# Patient Record
Sex: MALE | Race: WHITE | Employment: FULL TIME | URBAN - METROPOLITAN AREA
[De-identification: names, ages, dates, MRNs, and addresses within clinical notes are randomized per-mention and may not be internally consistent; named-entity substitution may affect disease eponyms.]

---

## 2024-05-29 NOTE — PROGRESS NOTES
Subjective:   Luciano Cueto is a 44 year old male who presents for Physical (Lab orders, )   Patient is a pleasant 44-year-old male new to this office new to this provider, no documented past medical history in system. Patient comes to office today to establish care and obtain physical.  Patient states his health is ok but he has new problem of yellowing of eyes, vanita colored stools, and bright yellow urine. This started last Wednesday. He denies abd pain. No itching. No issues. Will run labs.     PMH: Elevated blood pressure  not on meds.   PSH: no  Medications: None, no supplements. Magnesium and zinc OTC but stopped last week. Only used x 1 month    Diet: Small breakfast, crustable for lunch, 3 protein bars. Does eat out 3 times per week. Educated to cut back and on Dash.   Exercise: Lifts weights M-F. Has gym at work. Does HIT. Adds cardio at real gym here and there.  Sleep: Sleep is good. Issues when he stays out late.   Stress: minimal. Works out when stressed.   Social: Single, no kids, lives in [place] with brother.  Sexually Active: Has not been recently.  Prophylaxis: Condom if he is.   Alcohol: 2-3 beers a night on weekends only.   Tobacco: No tobacco, smokes marijuana daily.   Work:  for [employer]. [employer].   Vaccinations: Tetanus in office today.   Depression Screening: PHQ9 Score: 1  Refills: N/A            History reviewed. No pertinent past medical history.   History reviewed. No pertinent surgical history.     History/Other:    Chief Complaint Reviewed and Verified  Nursing Notes Reviewed and   Verified  Tobacco Reviewed  Allergies Reviewed  Medications Reviewed    Problem List Reviewed  Medical History Reviewed  Surgical History   Reviewed  Family History Reviewed  Social History Reviewed         Tobacco:  He has never smoked tobacco.  Social History     Tobacco Use   Smoking Status Never    Passive exposure: Never   Smokeless Tobacco Never      E-Cigarettes/Vaping       Questions Responses    E-Cigarette Use Current Every Day User          E-Cigarette/Vaping Substances       Questions Responses    Nicotine Yes          E-Cigarette/Vaping Devices       Questions Responses    Disposable Yes           Tobacco cessation counseling for <3 minutes.    Current Outpatient Medications   Medication Sig Dispense Refill    clotrimazole 1 % External Cream Apply 1 Application topically 2 (two) times daily. 60 g 1         Review of Systems:  Review of Systems   Constitutional: Negative.  Negative for activity change, chills and fever.   HENT: Negative.  Negative for congestion, ear pain, postnasal drip, sinus pain, sore throat and trouble swallowing.    Respiratory: Negative.  Negative for cough, shortness of breath and wheezing.    Cardiovascular: Negative.  Negative for chest pain and leg swelling.   Gastrointestinal: Negative.  Negative for abdominal pain, blood in stool, constipation and diarrhea.        Color change   Endocrine: Negative.    Genitourinary: Negative.  Negative for difficulty urinating, dysuria and flank pain.        Bright urine   Musculoskeletal: Negative.  Negative for arthralgias, back pain and neck stiffness.   Skin:  Positive for color change. Negative for rash.   Neurological: Negative.  Negative for dizziness and headaches.   Hematological:  Negative for adenopathy.         Objective:   BP (!) 138/96 (BP Location: Right arm, Patient Position: Sitting, Cuff Size: large)   Pulse 114   Temp 97.3 °F (36.3 °C)   Ht 5' 11\" (1.803 m)   Wt 244 lb (110.7 kg)   SpO2 98%   BMI 34.03 kg/m²  Estimated body mass index is 34.03 kg/m² as calculated from the following:    Height as of this encounter: 5' 11\" (1.803 m).    Weight as of this encounter: 244 lb (110.7 kg).  Physical Exam  Vitals and nursing note reviewed.   Constitutional:       Appearance: Normal appearance. He is obese.   HENT:      Head: Normocephalic and atraumatic.      Right Ear:  Tympanic membrane, ear canal and external ear normal. There is no impacted cerumen.      Left Ear: Tympanic membrane, ear canal and external ear normal. There is no impacted cerumen.      Nose: Nose normal. No congestion or rhinorrhea.      Mouth/Throat:      Mouth: Mucous membranes are moist.      Pharynx: No oropharyngeal exudate or posterior oropharyngeal erythema.   Eyes:      General: Scleral icterus present.      Extraocular Movements: Extraocular movements intact.      Pupils: Pupils are equal, round, and reactive to light.   Neck:      Vascular: No carotid bruit.   Cardiovascular:      Rate and Rhythm: Normal rate and regular rhythm.      Pulses: Normal pulses.      Heart sounds: Normal heart sounds. No murmur heard.  Pulmonary:      Effort: Pulmonary effort is normal. No respiratory distress.      Breath sounds: Normal breath sounds. No wheezing.   Abdominal:      General: There is no distension.      Palpations: Abdomen is soft. There is no mass.      Tenderness: There is no abdominal tenderness. There is no right CVA tenderness, left CVA tenderness, guarding or rebound.      Hernia: No hernia is present.   Musculoskeletal:         General: No swelling or tenderness. Normal range of motion.      Cervical back: Normal range of motion and neck supple. No rigidity or tenderness.      Right lower leg: No edema.      Left lower leg: No edema.   Lymphadenopathy:      Cervical: No cervical adenopathy.   Skin:     General: Skin is warm and dry.      Capillary Refill: Capillary refill takes less than 2 seconds.      Coloration: Skin is not jaundiced or pale.      Findings: No bruising, erythema, lesion or rash.   Neurological:      General: No focal deficit present.      Mental Status: He is alert and oriented to person, place, and time.   Psychiatric:         Mood and Affect: Mood normal.         Behavior: Behavior normal.           Assessment & Plan:   1. Encounter for wellness examination in adult  (Primary)  Assessment & Plan:  Wellness labs ordered.  Encouraged increasing physical activity which includes raising heart rate 3 to 5 days/week for at least 30 minutes at a time, while also performing mild strength exercises.  Patient counseled on importance of dietary modifications, which may include limiting fats, red meat, and carbohydrates, while increasing fruit and vegetable intake, and trying to adhere to a low sodium/Mediterranean diet.  Encouraged to manage stress.  Encouraged good sleep habits.  Encouraged good sexual health.    Patient aware of plan of care. All questions answered to satisfaction of the patient. Patient instructed to call office or reach out via [employer] if any issues arise. For urgent issues and/or reviewed red flags please proceed to the urgent care or ER.  Also, inform the nurse practitioner with any new symptoms or medication side effects.      Orders:  -     Hemoglobin A1C; Future; Expected date: 05/30/2024  -     CBC With Differential With Platelet; Future; Expected date: 05/30/2024  -     Comp Metabolic Panel (14); Future; Expected date: 05/30/2024  -     Lipid Panel; Future; Expected date: 05/30/2024  -     TSH W Reflex To Free T4; Future; Expected date: 05/30/2024  2. Colt-colored stools  Assessment & Plan:  Reports colt colored stools that have started in the last week with bright urine and yellowing of eyes.  Check CMP    Orders:  -     Comp Metabolic Panel (14); Future; Expected date: 05/30/2024  3. Urine color appears bright  Assessment & Plan:  Reports colt colored stools that have started in the last week with bright urine and yellowing of eyes.  Check CMP  Screen in office with large amounts of bilirubin  Orders:  -     Comp Metabolic Panel (14); Future; Expected date: 05/30/2024  -     URINALYSIS, AUTO, W/O SCOPE  4. Yellow eyes  Assessment & Plan:  Reports colt colored stools that have started in the last week with bright urine and yellowing of eyes.  Check CMP  Drinks  2-3 drinks daily on weekends.  No supplements  Excessive protein when working out.  Orders:  -     Comp Metabolic Panel (14); Future; Expected date: 05/30/2024  5. Need for tetanus booster  Assessment & Plan:  Tetanus in office today.  Orders:  -     TETANUS, DIPHTHERIA TOXOIDS AND ACELLULAR PERTUSIS VACCINE (TDAP), >7 YEARS, IM USE  6. Tinea pedis of both feet  Assessment & Plan:  Bilateral feet interdigits mild  Start clotrimazole twice daily x 14 days  Follow-up as needed  Orders:  -     Clotrimazole; Apply 1 Application topically 2 (two) times daily.  Dispense: 60 g; Refill: 1  7. Onychomycosis  Assessment & Plan:  Right foot with onychomycosis to multiple nails  Not a candidate for terbinafine with likely liver issue secondary to jaundice.    Referral to podiatry for evaluation for removal    Orders:  -     Podiatry Referral        Return if symptoms worsen or fail to improve.    MONA Love, 5/29/2024, 1:31 PM

## 2024-05-30 ENCOUNTER — APPOINTMENT (OUTPATIENT)
Dept: LAB | Age: 44
End: 2024-05-30
Payer: COMMERCIAL

## 2024-05-30 ENCOUNTER — OFFICE VISIT (OUTPATIENT)
Dept: FAMILY MEDICINE CLINIC | Facility: CLINIC | Age: 44
End: 2024-05-30

## 2024-05-30 ENCOUNTER — LAB ENCOUNTER (OUTPATIENT)
Dept: LAB | Age: 44
End: 2024-05-30
Payer: COMMERCIAL

## 2024-05-30 VITALS
OXYGEN SATURATION: 98 % | DIASTOLIC BLOOD PRESSURE: 88 MMHG | TEMPERATURE: 97 F | HEART RATE: 114 BPM | WEIGHT: 244 LBS | HEIGHT: 71 IN | BODY MASS INDEX: 34.16 KG/M2 | SYSTOLIC BLOOD PRESSURE: 136 MMHG

## 2024-05-30 DIAGNOSIS — R17 YELLOW EYES: ICD-10-CM

## 2024-05-30 DIAGNOSIS — B35.3 TINEA PEDIS OF BOTH FEET: ICD-10-CM

## 2024-05-30 DIAGNOSIS — R82.998 URINE COLOR APPEARS BRIGHT: ICD-10-CM

## 2024-05-30 DIAGNOSIS — R19.5 CLAY-COLORED STOOLS: ICD-10-CM

## 2024-05-30 DIAGNOSIS — B35.1 ONYCHOMYCOSIS: ICD-10-CM

## 2024-05-30 DIAGNOSIS — Z00.00 ENCOUNTER FOR WELLNESS EXAMINATION IN ADULT: ICD-10-CM

## 2024-05-30 DIAGNOSIS — Z00.00 ENCOUNTER FOR WELLNESS EXAMINATION IN ADULT: Primary | ICD-10-CM

## 2024-05-30 DIAGNOSIS — Z23 NEED FOR TETANUS BOOSTER: ICD-10-CM

## 2024-05-30 LAB
ALBUMIN SERPL-MCNC: 3.7 G/DL (ref 3.2–4.8)
ALBUMIN/GLOB SERPL: 0.9 {RATIO} (ref 1–2)
ALP LIVER SERPL-CCNC: 131 U/L
ALT SERPL-CCNC: >3300 U/L
ANION GAP SERPL CALC-SCNC: 7 MMOL/L (ref 0–18)
APPEARANCE: CLEAR
AST SERPL-CCNC: 1871 U/L (ref ?–34)
BASOPHILS # BLD AUTO: 0.12 X10(3) UL (ref 0–0.2)
BASOPHILS NFR BLD AUTO: 1.6 %
BILIRUB SERPL-MCNC: 14.6 MG/DL (ref 0.3–1.2)
BUN BLD-MCNC: 10 MG/DL (ref 9–23)
BUN/CREAT SERPL: 10.9 (ref 10–20)
CALCIUM BLD-MCNC: 9 MG/DL (ref 8.7–10.4)
CHLORIDE SERPL-SCNC: 102 MMOL/L (ref 98–112)
CHOLEST SERPL-MCNC: 130 MG/DL (ref ?–200)
CO2 SERPL-SCNC: 28 MMOL/L (ref 21–32)
CREAT BLD-MCNC: 0.92 MG/DL
DEPRECATED RDW RBC AUTO: 44.8 FL (ref 35.1–46.3)
EGFRCR SERPLBLD CKD-EPI 2021: 106 ML/MIN/1.73M2 (ref 60–?)
EOSINOPHIL # BLD AUTO: 0.03 X10(3) UL (ref 0–0.7)
EOSINOPHIL NFR BLD AUTO: 0.4 %
ERYTHROCYTE [DISTWIDTH] IN BLOOD BY AUTOMATED COUNT: 13.9 % (ref 11–15)
EST. AVERAGE GLUCOSE BLD GHB EST-MCNC: 117 MG/DL (ref 68–126)
FASTING PATIENT LIPID ANSWER: YES
FASTING STATUS PATIENT QL REPORTED: YES
GLOBULIN PLAS-MCNC: 4 G/DL (ref 2–3.5)
GLUCOSE (URINE DIPSTICK): NEGATIVE MG/DL
GLUCOSE BLD-MCNC: 92 MG/DL (ref 70–99)
HBA1C MFR BLD: 5.7 % (ref ?–5.7)
HCT VFR BLD AUTO: 46.1 %
HDLC SERPL-MCNC: <5 MG/DL (ref 40–59)
HGB BLD-MCNC: 15.7 G/DL
IMM GRANULOCYTES # BLD AUTO: 0.03 X10(3) UL (ref 0–1)
IMM GRANULOCYTES NFR BLD: 0.4 %
KETONES (URINE DIPSTICK): NEGATIVE MG/DL
LEUKOCYTES: NEGATIVE
LYMPHOCYTES # BLD AUTO: 1.54 X10(3) UL (ref 1–4)
LYMPHOCYTES NFR BLD AUTO: 21.1 %
MCH RBC QN AUTO: 30 PG (ref 26–34)
MCHC RBC AUTO-ENTMCNC: 34.1 G/DL (ref 31–37)
MCV RBC AUTO: 88.1 FL
MONOCYTES # BLD AUTO: 0.97 X10(3) UL (ref 0.1–1)
MONOCYTES NFR BLD AUTO: 13.3 %
MULTISTIX LOT#: ABNORMAL NUMERIC
NEUTROPHILS # BLD AUTO: 4.61 X10 (3) UL (ref 1.5–7.7)
NEUTROPHILS # BLD AUTO: 4.61 X10(3) UL (ref 1.5–7.7)
NEUTROPHILS NFR BLD AUTO: 63.2 %
NITRITE, URINE: NEGATIVE
OSMOLALITY SERPL CALC.SUM OF ELEC: 283 MOSM/KG (ref 275–295)
PH, URINE: 6 (ref 4.5–8)
PLATELET # BLD AUTO: 262 10(3)UL (ref 150–450)
POTASSIUM SERPL-SCNC: 4.3 MMOL/L (ref 3.5–5.1)
PROT SERPL-MCNC: 7.7 G/DL (ref 5.7–8.2)
PROTEIN (URINE DIPSTICK): NEGATIVE MG/DL
RBC # BLD AUTO: 5.23 X10(6)UL
SODIUM SERPL-SCNC: 137 MMOL/L (ref 136–145)
SPECIFIC GRAVITY: 1.01 (ref 1–1.03)
TRIGL SERPL-MCNC: 134 MG/DL (ref 30–149)
TSI SER-ACNC: 0.29 MIU/ML (ref 0.55–4.78)
URINE-COLOR: YELLOW
UROBILINOGEN,SEMI-QN: 0.2 MG/DL (ref 0–1.9)
WBC # BLD AUTO: 7.3 X10(3) UL (ref 4–11)

## 2024-05-30 PROCEDURE — 81003 URINALYSIS AUTO W/O SCOPE: CPT

## 2024-05-30 PROCEDURE — 90471 IMMUNIZATION ADMIN: CPT

## 2024-05-30 PROCEDURE — 84443 ASSAY THYROID STIM HORMONE: CPT

## 2024-05-30 PROCEDURE — 99214 OFFICE O/P EST MOD 30 MIN: CPT

## 2024-05-30 PROCEDURE — 90715 TDAP VACCINE 7 YRS/> IM: CPT

## 2024-05-30 PROCEDURE — 84439 ASSAY OF FREE THYROXINE: CPT

## 2024-05-30 PROCEDURE — 99386 PREV VISIT NEW AGE 40-64: CPT

## 2024-05-30 PROCEDURE — 83036 HEMOGLOBIN GLYCOSYLATED A1C: CPT

## 2024-05-30 PROCEDURE — 85025 COMPLETE CBC W/AUTO DIFF WBC: CPT

## 2024-05-30 PROCEDURE — 80053 COMPREHEN METABOLIC PANEL: CPT

## 2024-05-30 PROCEDURE — 84481 FREE ASSAY (FT-3): CPT

## 2024-05-30 PROCEDURE — 36415 COLL VENOUS BLD VENIPUNCTURE: CPT

## 2024-05-30 PROCEDURE — 80061 LIPID PANEL: CPT

## 2024-05-30 RX ORDER — CLOTRIMAZOLE 1 %
1 CREAM (GRAM) TOPICAL 2 TIMES DAILY
Qty: 60 G | Refills: 1 | Status: SHIPPED | OUTPATIENT
Start: 2024-05-30

## 2024-05-30 NOTE — ASSESSMENT & PLAN NOTE
Reports vanita colored stools that have started in the last week with bright urine and yellowing of eyes.  Check CMP  Screen in office with large amounts of bilirubin

## 2024-05-30 NOTE — ASSESSMENT & PLAN NOTE
Reports vanita colored stools that have started in the last week with bright urine and yellowing of eyes.  Check CMP  Drinks 2-3 drinks daily on weekends.  No supplements  Excessive protein when working out.

## 2024-05-30 NOTE — ASSESSMENT & PLAN NOTE
Wellness labs ordered.  Encouraged increasing physical activity which includes raising heart rate 3 to 5 days/week for at least 30 minutes at a time, while also performing mild strength exercises.  Patient counseled on importance of dietary modifications, which may include limiting fats, red meat, and carbohydrates, while increasing fruit and vegetable intake, and trying to adhere to a low sodium/Mediterranean diet.  Encouraged to manage stress.  Encouraged good sleep habits.  Encouraged good sexual health.    Patient aware of plan of care. All questions answered to satisfaction of the patient. Patient instructed to call office or reach out via [email or web address] if any issues arise. For urgent issues and/or reviewed red flags please proceed to the urgent care or ER.  Also, inform the nurse practitioner with any new symptoms or medication side effects.

## 2024-05-30 NOTE — ASSESSMENT & PLAN NOTE
Reports vanita colored stools that have started in the last week with bright urine and yellowing of eyes.  Check CMP

## 2024-05-30 NOTE — ASSESSMENT & PLAN NOTE
Right foot with onychomycosis to multiple nails  Not a candidate for terbinafine with likely liver issue secondary to jaundice.    Referral to podiatry for evaluation for removal

## 2024-05-31 ENCOUNTER — APPOINTMENT (OUTPATIENT)
Dept: CT IMAGING | Facility: HOSPITAL | Age: 44
End: 2024-05-31
Attending: EMERGENCY MEDICINE
Payer: COMMERCIAL

## 2024-05-31 ENCOUNTER — HOSPITAL ENCOUNTER (EMERGENCY)
Facility: HOSPITAL | Age: 44
Discharge: STILL ADMITTED OR ADMINISTRATIVE | End: 2024-05-31
Payer: COMMERCIAL

## 2024-05-31 ENCOUNTER — TELEPHONE (OUTPATIENT)
Dept: FAMILY MEDICINE CLINIC | Facility: CLINIC | Age: 44
End: 2024-05-31

## 2024-05-31 ENCOUNTER — HOSPITAL ENCOUNTER (EMERGENCY)
Facility: HOSPITAL | Age: 44
Discharge: HOME | End: 2024-05-31
Attending: EMERGENCY MEDICINE
Payer: COMMERCIAL

## 2024-05-31 VITALS
DIASTOLIC BLOOD PRESSURE: 77 MMHG | RESPIRATION RATE: 15 BRPM | OXYGEN SATURATION: 97 % | HEIGHT: 71 IN | HEART RATE: 86 BPM | BODY MASS INDEX: 34.16 KG/M2 | WEIGHT: 244 LBS | SYSTOLIC BLOOD PRESSURE: 121 MMHG | TEMPERATURE: 98 F

## 2024-05-31 DIAGNOSIS — K74.60 CIRRHOSIS OF LIVER WITHOUT ASCITES, UNSPECIFIED HEPATIC CIRRHOSIS TYPE (HCC): ICD-10-CM

## 2024-05-31 DIAGNOSIS — E05.90 SUBCLINICAL HYPERTHYROIDISM: Primary | ICD-10-CM

## 2024-05-31 DIAGNOSIS — R17 ELEVATED BILIRUBIN: Primary | ICD-10-CM

## 2024-05-31 DIAGNOSIS — R17 JAUNDICE: Primary | ICD-10-CM

## 2024-05-31 DIAGNOSIS — R17 ELEVATED BILIRUBIN: ICD-10-CM

## 2024-05-31 DIAGNOSIS — E78.6 LOW HDL (UNDER 40): ICD-10-CM

## 2024-05-31 DIAGNOSIS — R74.8 ELEVATED LIVER ENZYMES: ICD-10-CM

## 2024-05-31 LAB
ALBUMIN SERPL-MCNC: 3.6 G/DL (ref 3.2–4.8)
ALP LIVER SERPL-CCNC: 132 U/L
ALT SERPL-CCNC: 3135 U/L
ANION GAP SERPL CALC-SCNC: 3 MMOL/L (ref 0–18)
AST SERPL-CCNC: 1064 U/L (ref ?–34)
BASOPHILS # BLD AUTO: 0.15 X10(3) UL (ref 0–0.2)
BASOPHILS NFR BLD AUTO: 1.5 %
BILIRUB DIRECT SERPL-MCNC: 10.6 MG/DL (ref ?–0.3)
BILIRUB SERPL-MCNC: 14.6 MG/DL (ref 0.3–1.2)
BUN BLD-MCNC: 11 MG/DL (ref 9–23)
BUN/CREAT SERPL: 11.2 (ref 10–20)
CALCIUM BLD-MCNC: 8.4 MG/DL (ref 8.7–10.4)
CHLORIDE SERPL-SCNC: 103 MMOL/L (ref 98–112)
CO2 SERPL-SCNC: 28 MMOL/L (ref 21–32)
CREAT BLD-MCNC: 0.98 MG/DL
DEPRECATED RDW RBC AUTO: 46.1 FL (ref 35.1–46.3)
EGFRCR SERPLBLD CKD-EPI 2021: 98 ML/MIN/1.73M2 (ref 60–?)
EOSINOPHIL # BLD AUTO: 0.04 X10(3) UL (ref 0–0.7)
EOSINOPHIL NFR BLD AUTO: 0.4 %
ERYTHROCYTE [DISTWIDTH] IN BLOOD BY AUTOMATED COUNT: 14.3 % (ref 11–15)
ETHANOL SERPL-MCNC: <3 MG/DL (ref ?–3)
GLUCOSE BLD-MCNC: 105 MG/DL (ref 70–99)
HCT VFR BLD AUTO: 44.6 %
HGB BLD-MCNC: 15 G/DL
IGA SERPL-MCNC: 525.4 MG/DL (ref 40–350)
IGM SERPL-MCNC: 360.6 MG/DL (ref 50–300)
IMM GRANULOCYTES # BLD AUTO: 0.02 X10(3) UL (ref 0–1)
IMM GRANULOCYTES NFR BLD: 0.2 %
IMMUNOGLOBULIN PNL SER-MCNC: 1752 MG/DL (ref 650–1600)
INR BLD: 1.35 (ref 0.8–1.2)
LIPASE SERPL-CCNC: 46 U/L (ref 13–75)
LYMPHOCYTES # BLD AUTO: 3.83 X10(3) UL (ref 1–4)
LYMPHOCYTES NFR BLD AUTO: 39.5 %
MCH RBC QN AUTO: 30.1 PG (ref 26–34)
MCHC RBC AUTO-ENTMCNC: 33.6 G/DL (ref 31–37)
MCV RBC AUTO: 89.4 FL
MONOCYTES # BLD AUTO: 1.19 X10(3) UL (ref 0.1–1)
MONOCYTES NFR BLD AUTO: 12.3 %
NEUTROPHILS # BLD AUTO: 4.47 X10 (3) UL (ref 1.5–7.7)
NEUTROPHILS # BLD AUTO: 4.47 X10(3) UL (ref 1.5–7.7)
NEUTROPHILS NFR BLD AUTO: 46.1 %
OSMOLALITY SERPL CALC.SUM OF ELEC: 278 MOSM/KG (ref 275–295)
PLATELET # BLD AUTO: 399 10(3)UL (ref 150–450)
POTASSIUM SERPL-SCNC: 3.8 MMOL/L (ref 3.5–5.1)
PROT SERPL-MCNC: 7.7 G/DL (ref 5.7–8.2)
PROTHROMBIN TIME: 17.5 SECONDS (ref 11.6–14.8)
RBC # BLD AUTO: 4.99 X10(6)UL
SODIUM SERPL-SCNC: 134 MMOL/L (ref 136–145)
T3FREE SERPL-MCNC: 2.9 PG/ML (ref 2.4–4.2)
T4 FREE SERPL-MCNC: 1.3 NG/DL (ref 0.8–1.7)
WBC # BLD AUTO: 9.7 X10(3) UL (ref 4–11)

## 2024-05-31 PROCEDURE — 82784 ASSAY IGA/IGD/IGG/IGM EACH: CPT | Performed by: EMERGENCY MEDICINE

## 2024-05-31 PROCEDURE — 74177 CT ABD & PELVIS W/CONTRAST: CPT | Performed by: EMERGENCY MEDICINE

## 2024-05-31 PROCEDURE — 85610 PROTHROMBIN TIME: CPT | Performed by: EMERGENCY MEDICINE

## 2024-05-31 PROCEDURE — 82077 ASSAY SPEC XCP UR&BREATH IA: CPT | Performed by: EMERGENCY MEDICINE

## 2024-05-31 PROCEDURE — 83516 IMMUNOASSAY NONANTIBODY: CPT | Performed by: EMERGENCY MEDICINE

## 2024-05-31 PROCEDURE — 83690 ASSAY OF LIPASE: CPT | Performed by: EMERGENCY MEDICINE

## 2024-05-31 PROCEDURE — 80048 BASIC METABOLIC PNL TOTAL CA: CPT | Performed by: EMERGENCY MEDICINE

## 2024-05-31 PROCEDURE — 99285 EMERGENCY DEPT VISIT HI MDM: CPT

## 2024-05-31 PROCEDURE — 36415 COLL VENOUS BLD VENIPUNCTURE: CPT

## 2024-05-31 PROCEDURE — 99284 EMERGENCY DEPT VISIT MOD MDM: CPT

## 2024-05-31 PROCEDURE — 85025 COMPLETE CBC W/AUTO DIFF WBC: CPT | Performed by: EMERGENCY MEDICINE

## 2024-05-31 PROCEDURE — 80074 ACUTE HEPATITIS PANEL: CPT | Performed by: EMERGENCY MEDICINE

## 2024-05-31 PROCEDURE — 80076 HEPATIC FUNCTION PANEL: CPT | Performed by: EMERGENCY MEDICINE

## 2024-05-31 NOTE — PROGRESS NOTES
1. Subclinical hyperthyroidism  TSH 0.288 and t3 and t4 wnl  Thyroid US ordered.  Referral to endocrine.  - US THYROID (CPT=76536); Future  - Endocrine Referral - In Network    2. Elevated liver enzymes  ALT greater than 3300  AST 1871  Alk phos 131  Bili 14.6  DDX include fulminant hepatitis, obstructing stone  Needs Liver US and repeat labs to assess for progression and hepatitis.  Called and spoke with patient, will proceed to ED.   - US LIVER (CPT=76705); Future  - GGT (Gamma Glutamyl Transpeptidase) [E]; Future  - Hepatitis A B + C profile [E]; Future  - Gastro Referral - In Network    3. Elevated bilirubin  Bili 14.6, additional labs ordered.  Eval with US  Sent to ED  - US LIVER (CPT=76705); Future  - GGT (Gamma Glutamyl Transpeptidase) [E]; Future  - Hepatitis A B + C profile [E]; Future  - Gastro Referral - In Network    4. Low HDL (under 40)  Assess liver with US, recheck 6 week following work up.   - US LIVER (CPT=76705); Future  - GGT (Gamma Glutamyl Transpeptidase) [E]; Future  - Hepatitis A B + C profile [E]; Future    Anthony Franklin, APRN

## 2024-05-31 NOTE — TELEPHONE ENCOUNTER
Called and spoke with patient. Recommend repeat labs and liver US. Recommend with Bilirubin being 14.6 eval in Ed to expedite process and rule out obstruction or progressing fulminant hepatitis. Patient states understanding.  Called [place] ED spoke with charge, made aware.

## 2024-06-01 ENCOUNTER — TELEPHONE (OUTPATIENT)
Facility: CLINIC | Age: 44
End: 2024-06-01

## 2024-06-01 DIAGNOSIS — B15.9 VIRAL HEPATITIS A WITHOUT HEPATIC COMA: Primary | ICD-10-CM

## 2024-06-01 LAB
HAV IGM SER QL: REACTIVE
HBV CORE IGM SER QL: NONREACTIVE
HBV SURFACE AG SERPL QL IA: NONREACTIVE
HCV AB SERPL QL IA: NONREACTIVE

## 2024-06-01 NOTE — ED QUICK NOTES
Call made to patient's listed phone number ([phone]). No answer, and no voicemail service provided.    This RN attempting to call patient to notify him of test results from ED visit (hepatitis A IgM). Per gastroenterology, patient may still be able to follow-up as outpatient. Patient should return to ED for signs of encephalopathy or for vomiting.    Will endorse to next charge RN to attempt calling closer to 7am.

## 2024-06-01 NOTE — ED PROVIDER NOTES
Patient Seen in: [facility]    History   No chief complaint on file.      HPI    The patient presents to the ED complaining of his body turning yellow several days ago.  He states that his eyes are now yellow as well as the skin.  Went to immediate care yesterday which showed abnormal labs.  He was referred to the ER for further workup.  He denies any abdominal pain or other complaints.    History reviewed. History reviewed. No pertinent past medical history.    History reviewed. History reviewed. No pertinent surgical history.      Medications :  (Not in a hospital admission)       Family History   Problem Relation Age of Onset    Hypertension Father     Diabetes Father     Hypertension Mother     Diabetes Mother        Smoking Status:   Social History     Socioeconomic History    Marital status: Single   Tobacco Use    Smoking status: Never     Passive exposure: Never    Smokeless tobacco: Never   Vaping Use    Vaping status: Every Day    Substances: Nicotine    Devices: Disposable   Substance and Sexual Activity    Alcohol use: Yes    Drug use: Yes     Types: Cocaine, Cannabis       Constitutional and vital signs reviewed.      Social History and Family History elements reviewed from today, pertinent positives to the presenting problem noted.    Physical Exam     ED Triage Vitals [05/31/24 1952]   BP (!) 154/104   Pulse 101   Resp 20   Temp 98.4 °F (36.9 °C)   Temp src    SpO2 99 %   O2 Device None (Room air)       All measures to prevent infection transmission during my interaction with the patient were taken. Handwashing was performed prior to and after the exam.  Stethoscope and any equipment used during my examination was cleaned with super sani-cloth germicidal wipes following the exam.     Physical Exam  Vitals and nursing note reviewed.   Constitutional:       General: He is not in acute distress.     Appearance: He is well-developed. He is obese.   HENT:      Head:  Normocephalic and atraumatic.   Eyes:      General: Scleral icterus present.         Right eye: No discharge.         Left eye: No discharge.      Conjunctiva/sclera: Conjunctivae normal.   Neck:      Trachea: No tracheal deviation.   Cardiovascular:      Rate and Rhythm: Normal rate and regular rhythm.      Heart sounds: Normal heart sounds.   Pulmonary:      Effort: Pulmonary effort is normal. No respiratory distress.      Breath sounds: Normal breath sounds.   Abdominal:      General: There is no distension.      Palpations: Abdomen is soft.      Tenderness: There is no abdominal tenderness. There is no guarding or rebound.   Musculoskeletal:         General: No deformity.   Skin:     General: Skin is warm and dry.      Coloration: Skin is jaundiced.   Neurological:      Mental Status: He is alert and oriented to person, place, and time.   Psychiatric:         Mood and Affect: Mood normal.         Behavior: Behavior normal.         ED Course        Labs Reviewed   BASIC METABOLIC PANEL (8) - Abnormal; Notable for the following components:       Result Value    Glucose 105 (*)     Sodium 134 (*)     Calcium, Total 8.4 (*)     All other components within normal limits   HEPATIC FUNCTION PANEL (7) - Abnormal; Notable for the following components:    AST 1,064 (*)     ALT 3,135 (*)     Alkaline Phosphatase 132 (*)     Bilirubin, Total 14.6 (*)     Bilirubin, Direct 10.6 (*)     All other components within normal limits   PROTHROMBIN TIME (PT) - Abnormal; Notable for the following components:    PT 17.5 (*)     INR 1.35 (*)     All other components within normal limits   HEPATITIS PANEL, ACUTE (4) - Abnormal; Notable for the following components:    Hepatitis A Ab, IgM Reactive (*)     All other components within normal limits   IMMUNOGLOBULIN M, QUANT - Abnormal; Notable for the following components:    Immunoglobulin M 360.6 (*)     All other components within normal limits   IMMUNOGLOBULIN G, QUANT - Abnormal;  Notable for the following components:    Immunoglobulin G 1,752 (*)     All other components within normal limits   IMMUNOGLOBULIN A, QUANT - Abnormal; Notable for the following components:    Immunoglobulin A 525.40 (*)     All other components within normal limits   CBC W/ DIFFERENTIAL - Abnormal; Notable for the following components:    Monocyte Absolute 1.19 (*)     All other components within normal limits   LIPASE - Normal   ETHYL ALCOHOL - Normal   CBC WITH DIFFERENTIAL WITH PLATELET    Narrative:     The following orders were created for panel order CBC With Differential With Platelet.                  Procedure                               Abnormality         Status                                     ---------                               -----------         ------                                     CBC W/ DIFFERENTIAL[[id number]]          Abnormal            Final result                                                 Please view results for these tests on the individual orders.   SCAN SLIDE   ACTIN (SMOOTH MUSCLE) ANTIBODY   HOLD REACTIVE HEPATITIS A IGM       As Interpreted by me    Imaging Results Available and Reviewed while in ED: CT abdomen pelvis with IV contrast  ED Medications Administered:   Medications   iopamidol 76% (ISOVUE-370) injection for power injector (80 mL Intravenous Given 5/31/24 2159)         MDM     Vitals:    05/31/24 1952 05/31/24 2030 05/31/24 2200 05/31/24 2245   BP: (!) 154/104 127/79 139/82 121/77   Pulse: 101 89 93 86   Resp: 20 22 20 15   Temp: 98.4 °F (36.9 °C)      SpO2: 99% 97% 98% 97%   Weight: 110.7 kg      Height: 180.3 cm (5' 11\")        *I personally reviewed and interpreted all ED vitals.    Pulse Ox: 97%, Room air, Normal     Monitor Interpretation:   normal sinus rhythm as interpreted by me.  The cardiac monitor was ordered to monitor heart rate.    Differential Diagnosis/ Diagnostic Considerations: Alcoholic hepatitis, pancreatic mass, obstructive jaundice,  hepatitis, autoimmune, other    Complicating Factors: The patient already has does not have any pertinent problems on file. to contribute to the complexity of this ED evaluation.    Medical Decision Making  The patient presents to the ED with painless jaundice.  Nondistressed on examination.  No abdominal tenderness, no other findings on exam.  Laboratory testing sent and shows significant LFT elevation.  CT obtained which shows no intra-abdominal pathology.  I discussed with Dr. Bullock who recommends additional laboratory testing including hepatitis workup and autoimmune testing and outpatient follow-up.  Additional labs ordered and patient stable for discharge.  He agrees to follow-up with GI ASAP for further evaluation.    Problems Addressed:  Cirrhosis of liver without ascites, unspecified hepatic cirrhosis type (HCC): acute illness or injury that poses a threat to life or bodily functions  Jaundice: acute illness or injury that poses a threat to life or bodily functions    Amount and/or Complexity of Data Reviewed  Labs: ordered. Decision-making details documented in ED Course.  Radiology: ordered and independent interpretation performed. Decision-making details documented in ED Course.     Details: I personally reviewed the patient's CT abdomen pelvis and noted no free fluid or SBO  Discussion of management or test interpretation with external provider(s):  I discussed with Dr. Bullock for GI consultation        Condition upon leaving the department: Stable    Disposition and Plan     Clinical Impression:  1. Jaundice    2. Cirrhosis of liver without ascites, unspecified hepatic cirrhosis type (HCC)        Disposition:  Discharge    Follow-up:  Kenneth Bullock MD  [address] [place] [address]  [facility] [address]  [phone]    Schedule an appointment as soon as possible for a visit in 5 day(s)        Medications Prescribed:  Discharge Medication List as of 5/31/2024 10:51 PM

## 2024-06-01 NOTE — TELEPHONE ENCOUNTER
GI RNs:  This patient was seen in the ER for jaundice and found to have acute hepatitis A.  He needs to have labs drawn on Monday and see a provider in the next few days.

## 2024-06-01 NOTE — ED INITIAL ASSESSMENT (HPI)
S: Pt went to see pcp due to bright yellow urine and change in stool, then pt developed scleral jaundice. Labs show significant transaminitis and elevated bilirubin.

## 2024-06-01 NOTE — ED QUICK NOTES
Pt cleared by MD for discharge. Belongings with patient. Patient instructions reviewed including when and how to follow up with healthcare and when to seek for medical treatment. Peripheral IV memoved. Pt denies abdominal pain. Denies n/v. Pt states he has [facility] my chart and he will follow up online for results. Pt aware that he needs to follow up with GI

## 2024-06-03 ENCOUNTER — LAB ENCOUNTER (OUTPATIENT)
Dept: LAB | Age: 44
End: 2024-06-03
Attending: INTERNAL MEDICINE
Payer: COMMERCIAL

## 2024-06-03 DIAGNOSIS — B15.9 VIRAL HEPATITIS A WITHOUT HEPATIC COMA: ICD-10-CM

## 2024-06-03 LAB
ACTIN SMOOTH MUSCLE AB: 25 UNITS
ALBUMIN SERPL-MCNC: 3.5 G/DL (ref 3.2–4.8)
ALP LIVER SERPL-CCNC: 135 U/L
ALT SERPL-CCNC: 1369 U/L
AST SERPL-CCNC: 246 U/L (ref ?–34)
BILIRUB DIRECT SERPL-MCNC: 8.1 MG/DL (ref ?–0.3)
BILIRUB SERPL-MCNC: 10.2 MG/DL (ref 0.3–1.2)
INR BLD: 1.25 (ref 0.8–1.2)
PROT SERPL-MCNC: 7.4 G/DL (ref 5.7–8.2)
PROTHROMBIN TIME: 16.4 SECONDS (ref 11.6–14.8)

## 2024-06-03 PROCEDURE — 36415 COLL VENOUS BLD VENIPUNCTURE: CPT

## 2024-06-03 PROCEDURE — 80076 HEPATIC FUNCTION PANEL: CPT

## 2024-06-03 PROCEDURE — 85610 PROTHROMBIN TIME: CPT

## 2024-06-03 NOTE — TELEPHONE ENCOUNTER
Called and spoke to the patient, date of birth and name verified.    He was informed to complete additional blood work.    He agreed to do this after work today, he understood it needs to be done before his appointment tomorrow with Isha CALLEJAS.

## 2024-06-04 ENCOUNTER — OFFICE VISIT (OUTPATIENT)
Facility: CLINIC | Age: 44
End: 2024-06-04

## 2024-06-04 VITALS
SYSTOLIC BLOOD PRESSURE: 134 MMHG | WEIGHT: 247 LBS | BODY MASS INDEX: 34.58 KG/M2 | DIASTOLIC BLOOD PRESSURE: 81 MMHG | HEIGHT: 71 IN | HEART RATE: 80 BPM

## 2024-06-04 DIAGNOSIS — R17 ELEVATED BILIRUBIN: ICD-10-CM

## 2024-06-04 DIAGNOSIS — R17 JAUNDICE: ICD-10-CM

## 2024-06-04 DIAGNOSIS — R16.0 HEPATOMEGALY: ICD-10-CM

## 2024-06-04 DIAGNOSIS — B15.9 ACUTE HEPATITIS A: Primary | ICD-10-CM

## 2024-06-04 PROCEDURE — 99214 OFFICE O/P EST MOD 30 MIN: CPT

## 2024-06-04 NOTE — PATIENT INSTRUCTIONS
- go to lab in 1 week for repeat testing  (June 11th)    -avoid all alcohol / tylenol     - Household contacts should see primary doctor for vaccine and possibly immunoglobulin    -follow up with me in 6 months

## 2024-06-04 NOTE — H&P
[facility] - Gastroenterology                                                                                                               Reason for consult: hepatitis A    Requesting physician or provider: No primary care provider on file.    Chief Complaint   Patient presents with    Consult     For elevated bilirubin       HPI:   Luciano Cueto is a 44 year old year-old male with active diagnoses including acute viral hepatitis A. Prior medical/surgical history in note table.    he is here today for ER follow up  #acute hepatitis A  -evaluated in ED on 5/31/24 for skin & eye jaundice, fatigue. Severe LFT elevation, +hepatitis A Ab IgM on labwork. CT abdomen +hepatomegaly w/ mild hepatic steatosis. Incidental cholelithiasis. No CBD dilatation.   -LFTs trending down, last checked 6/3. Direct bilirubin improved from 10.6 to 8.1  -pt denies recent travel. Symptoms prior to ED were 3 days fatigue, nausea, 2 episodes emesis, no appetite, jaundice, chills/sweats. Light colored stool  -since ED patient feels fatigue and jaundice are improving, denies abdominal pain    Patient denies symptoms of vomiting, dyspepsia, dysphagia, odynophagia, globus sensation, heartburn, hematemesis, abdominal pain, change in bowel habits, constipation, diarrhea, hematochezia, or melena. he denies recent change in appetite, fever or unintentional weight loss.      Last colonoscopy: none   Last EGD: none     NSAIDS: none   Tobacco: none   Alcohol: none currently, prior 8 drinks on weekend  Marijuana: occasional  Illicit drugs: none     FH GI malignancy: none   FH IBD: none     No history of adverse reaction to sedation  No ELLA  No anticoagulants/antiplatelet  No pacemaker/defibrillator    Wt Readings from Last 6 Encounters:   06/04/24 247 lb (112 kg)   05/31/24 244 lb (110.7 kg)   05/30/24 244 lb (110.7 kg)        History, Medications, Allergies,  ROS:      Past Medical History:    Hepatitis A      History reviewed. No pertinent surgical history.   Family Hx:   Family History   Problem Relation Age of Onset    Hypertension Father     Diabetes Father     Hypertension Mother     Diabetes Mother       Social History:   Social History     Socioeconomic History    Marital status: Single   Tobacco Use    Smoking status: Never     Passive exposure: Never    Smokeless tobacco: Never   Vaping Use    Vaping status: Every Day    Substances: Nicotine    Devices: Disposable   Substance and Sexual Activity    Alcohol use: Not Currently    Drug use: Yes     Types: Cocaine, Cannabis        Medications (Active prior to today's visit):  Current Outpatient Medications   Medication Sig Dispense Refill    clotrimazole 1 % External Cream Apply 1 Application topically 2 (two) times daily. 60 g 1       Allergies:  No Known Allergies    ROS:   CONSTITUTIONAL: negative for fevers, chills, sweats  EYES Negative for redness, and diplopia  HEENT: Negative for hoarseness  RESPIRATORY: Negative for cough and severe shortness of breath  CARDIOVASCULAR: Negative for crushing sub-sternal chest pain  GASTROINTESTINAL: See HPI  GENITOURINARY: Negative for dysuria  MUSCULOSKELETAL: Negative for arthralgias and myalgias  SKIN: positive jaundice, Negative for rash or pruritus  NEUROLOGICAL: Negative for dizziness and headaches  BEHAVIOR/PSYCH: Negative for psychotic behavior    PHYSICAL EXAM:   Blood pressure 134/81, pulse 80, height 5' 11\" (1.803 m), weight 247 lb (112 kg).    GEN: Alert, no acute distress, well-nourished   HEENT: +icteric sclera, neck supple, trachea midline, MMM, no palpable or tender neck or supraclavicular lymph nodes  CV: RRR, the extremities are warm and well perfused   LUNGS: No increased work of breathing, CTAB  ABDOMEN: Soft, symmetrical, non-tender without distention or guarding. No scars or lesions. Aorta is without bruit or visible pulsation. Umbilicus is midline  without herniation. Normoactive bowel sounds are present, No masses, hepatomegaly or splenomegaly noted.  MSK: No erythema, no warmth, no swelling of joints  SKIN: + jaundice, no erythema, no rashes, no lesions  HEMATOLOGIC: No bleeding, no bruising  NEURO: Alert and interactive, LOJA  PSYCH: appropriate mood & affect    Labs/Imaging/Procedures:     Patient's pertinent labs and imaging were reviewed and discussed with patient today.        .  ASSESSMENT/PLAN:   Luciano Cueto is a 44 year old year-old male with active diagnoses including acute viral hepatitis A. Prior medical/surgical history in note table.    he is here today for ER follow up  #acute hepatitis A  -evaluated in ED on 5/31/24 for skin & eye jaundice, fatigue. Severe LFT elevation, +hepatitis A Ab IgM on labwork. CT abdomen +hepatomegaly w/ mild hepatic steatosis. Incidental cholelithiasis. No CBD dilatation.   -LFTs trending down, last checked 6/3. Direct bilirubin improved from 10.6 to 8.1  -pt denies recent travel. Symptoms prior to ED were 3 days fatigue, nausea, 2 episodes emesis, no appetite, jaundice, chills/sweats. Light colored stool  -since ED patient feels fatigue and jaundice are improving, denies abdominal pain    -plan recheck LFTs in 1 week. Will continue to trend until normalized. Plan for US liver in 6-12 months     Recommendations:  - go to lab in 1 week for repeat testing  (June 11th)    - avoid all alcohol / tylenol     - Household contacts should see primary doctor for vaccine and possibly immunoglobulin    -follow up with me in 6 months         Orders This Visit:  Orders Placed This Encounter   Procedures    Hepatic Function Panel (7)    Prothrombin Time (PT/INR)       Meds This Visit:  Requested Prescriptions      No prescriptions requested or ordered in this encounter       Imaging & Referrals:  None      MONA Humphreys    [facility]astroenterology  6/4/2024        This note was partially prepared using Dragon  Medical voice recognition dictation software. As a result, errors may occur. When identified, these errors have been corrected. While every attempt is made to correct errors during dictation, discrepancies may still exist.

## 2024-06-11 ENCOUNTER — LAB ENCOUNTER (OUTPATIENT)
Dept: LAB | Age: 44
End: 2024-06-11
Payer: COMMERCIAL

## 2024-06-11 DIAGNOSIS — R17 ELEVATED BILIRUBIN: ICD-10-CM

## 2024-06-11 DIAGNOSIS — R17 JAUNDICE: ICD-10-CM

## 2024-06-11 DIAGNOSIS — B15.9 ACUTE HEPATITIS A: ICD-10-CM

## 2024-06-11 LAB
ALBUMIN SERPL-MCNC: 3.8 G/DL (ref 3.2–4.8)
ALP LIVER SERPL-CCNC: 102 U/L
ALT SERPL-CCNC: 491 U/L
AST SERPL-CCNC: 160 U/L (ref ?–34)
BILIRUB DIRECT SERPL-MCNC: 2.6 MG/DL (ref ?–0.3)
BILIRUB SERPL-MCNC: 3.1 MG/DL (ref 0.3–1.2)
INR BLD: 1.06 (ref 0.8–1.2)
PROT SERPL-MCNC: 7.4 G/DL (ref 5.7–8.2)
PROTHROMBIN TIME: 14.5 SECONDS (ref 11.6–14.8)

## 2024-06-11 PROCEDURE — 80076 HEPATIC FUNCTION PANEL: CPT

## 2024-06-11 PROCEDURE — 85610 PROTHROMBIN TIME: CPT

## 2024-06-11 PROCEDURE — 36415 COLL VENOUS BLD VENIPUNCTURE: CPT

## 2024-06-12 DIAGNOSIS — B15.9 ACUTE HEPATITIS A: Primary | ICD-10-CM

## 2024-06-12 DIAGNOSIS — R17 ELEVATED BILIRUBIN: ICD-10-CM

## 2024-06-20 ENCOUNTER — TELEPHONE (OUTPATIENT)
Facility: CLINIC | Age: 44
End: 2024-06-20

## 2024-06-20 NOTE — TELEPHONE ENCOUNTER
Called & spoke with patient, advised him to return to lab on or after July 12th for repeat LFTs.    Isha Leroy, APRN

## 2024-07-11 ENCOUNTER — LAB ENCOUNTER (OUTPATIENT)
Dept: LAB | Age: 44
End: 2024-07-11
Payer: COMMERCIAL

## 2024-07-11 DIAGNOSIS — B15.9 ACUTE HEPATITIS A: ICD-10-CM

## 2024-07-11 DIAGNOSIS — R17 ELEVATED BILIRUBIN: ICD-10-CM

## 2024-07-11 LAB
ALBUMIN SERPL-MCNC: 4.1 G/DL (ref 3.2–4.8)
ALP LIVER SERPL-CCNC: 93 U/L
ALT SERPL-CCNC: 363 U/L
AST SERPL-CCNC: 122 U/L (ref ?–34)
BILIRUB DIRECT SERPL-MCNC: 0.7 MG/DL (ref ?–0.3)
BILIRUB SERPL-MCNC: 0.9 MG/DL (ref 0.3–1.2)
PROT SERPL-MCNC: 7.6 G/DL (ref 5.7–8.2)

## 2024-07-11 PROCEDURE — 80076 HEPATIC FUNCTION PANEL: CPT

## 2024-07-11 PROCEDURE — 36415 COLL VENOUS BLD VENIPUNCTURE: CPT

## 2024-07-16 DIAGNOSIS — B15.9 ACUTE HEPATITIS A: Primary | ICD-10-CM

## 2024-11-06 ENCOUNTER — TELEPHONE (OUTPATIENT)
Facility: CLINIC | Age: 44
End: 2024-11-06

## 2024-11-06 NOTE — TELEPHONE ENCOUNTER
1st [facility]:     Hepatic Function Panel (7) (Order #[id number]) on 7/16/24

## 2024-12-04 ENCOUNTER — OFFICE VISIT (OUTPATIENT)
Facility: CLINIC | Age: 44
End: 2024-12-04

## 2024-12-04 VITALS
BODY MASS INDEX: 36.12 KG/M2 | HEIGHT: 71 IN | HEART RATE: 97 BPM | SYSTOLIC BLOOD PRESSURE: 138 MMHG | WEIGHT: 258 LBS | DIASTOLIC BLOOD PRESSURE: 87 MMHG

## 2024-12-04 DIAGNOSIS — R16.0 HEPATOMEGALY: ICD-10-CM

## 2024-12-04 DIAGNOSIS — B15.9 ACUTE HEPATITIS A: Primary | ICD-10-CM

## 2024-12-04 PROCEDURE — 99214 OFFICE O/P EST MOD 30 MIN: CPT

## 2024-12-04 NOTE — PROGRESS NOTES
[facility] - Gastroenterology                                                                                                               Reason for consult: hepatitis A    Requesting physician or provider: No primary care provider on file.    Chief Complaint   Patient presents with    Follow - Up       HPI:   Luciano Cueto is a 44 year old year-old male with active diagnoses including acute viral hepatitis A. Prior medical/surgical history in note table.    he is here today for follow up  Today:  #hepatis A  #hepatomegaly  -acute hepatitis A in May with LFT elevation & jaundice. LFTs down trending as of July. CT finding of hepatomegaly and mild steatosis. Repeat labs overdue since September   -denies abdominal pain or jaundice    Prior visit 6/4/24:  #acute hepatitis A  -evaluated in ED on 5/31/24 for skin & eye jaundice, fatigue. Severe LFT elevation, +hepatitis A Ab IgM on labwork. CT abdomen +hepatomegaly w/ mild hepatic steatosis. Incidental cholelithiasis. No CBD dilatation.   -LFTs trending down, last checked 6/3. Direct bilirubin improved from 10.6 to 8.1  -pt denies recent travel. Symptoms prior to ED were 3 days fatigue, nausea, 2 episodes emesis, no appetite, jaundice, chills/sweats. Light colored stool  -since ED patient feels fatigue and jaundice are improving, denies abdominal pain    Patient denies symptoms of vomiting, dyspepsia, dysphagia, odynophagia, globus sensation, heartburn, hematemesis, abdominal pain, change in bowel habits, constipation, diarrhea, hematochezia, or melena. he denies recent change in appetite, fever or unintentional weight loss.      Last colonoscopy: none   Last EGD: none     NSAIDS: none   Tobacco: none   Alcohol: none currently, prior 8 drinks on weekend  Marijuana: occasional  Illicit drugs: none     FH GI malignancy: none   FH IBD: none     No history of adverse reaction to  sedation  No ELLA  No anticoagulants/antiplatelet  No pacemaker/defibrillator    Wt Readings from Last 6 Encounters:   12/04/24 258 lb (117 kg)   06/04/24 247 lb (112 kg)   05/31/24 244 lb (110.7 kg)   05/30/24 244 lb (110.7 kg)        History, Medications, Allergies, ROS:      Past Medical History:    Hepatitis A      History reviewed. No pertinent surgical history.   Family Hx:   Family History   Problem Relation Age of Onset    Hypertension Father     Diabetes Father     Hypertension Mother     Diabetes Mother       Social History:   Social History     Socioeconomic History    Marital status: Single   Tobacco Use    Smoking status: Never     Passive exposure: Never    Smokeless tobacco: Never   Vaping Use    Vaping status: Every Day    Substances: Nicotine    Devices: Disposable   Substance and Sexual Activity    Alcohol use: Not Currently     Comment: prior 8 drinks a week    Drug use: Yes     Types: Cocaine, Cannabis        Medications (Active prior to today's visit):  Current Outpatient Medications   Medication Sig Dispense Refill    clotrimazole 1 % External Cream Apply 1 Application topically 2 (two) times daily. (Patient not taking: Reported on 12/4/2024) 60 g 1       Allergies:  No Known Allergies    ROS:   CONSTITUTIONAL: negative for fevers, chills, sweats  EYES Negative for redness, and diplopia  HEENT: Negative for hoarseness  RESPIRATORY: Negative for cough and severe shortness of breath  CARDIOVASCULAR: Negative for crushing sub-sternal chest pain  GASTROINTESTINAL: See HPI  GENITOURINARY: Negative for dysuria  MUSCULOSKELETAL: Negative for arthralgias and myalgias  SKIN: Negative for rash or pruritus  NEUROLOGICAL: Negative for dizziness and headaches  BEHAVIOR/PSYCH: Negative for psychotic behavior    PHYSICAL EXAM:   Blood pressure 138/87, pulse 97, height 5' 11\" (1.803 m), weight 258 lb (117 kg).    GEN: Alert, no acute distress, well-nourished   HEENT: anicteric sclera, neck supple, trachea  midline, MMM, no palpable or tender neck or supraclavicular lymph nodes  CV: RRR, the extremities are warm and well perfused   LUNGS: No increased work of breathing, CTAB  ABDOMEN: Soft, symmetrical, non-tender without distention or guarding. No scars or lesions. Aorta is without bruit or visible pulsation. Umbilicus is midline without herniation. Normoactive bowel sounds are present, No masses, hepatomegaly or splenomegaly noted.  MSK: No erythema, no warmth, no swelling of joints  SKIN: no jaundice, no erythema, no rashes, no lesions  HEMATOLOGIC: No bleeding, no bruising  NEURO: Alert and interactive, LOJA  PSYCH: appropriate mood & affect    Labs/Imaging/Procedures:     Patient's pertinent labs and imaging were reviewed and discussed with patient today.        .  ASSESSMENT/PLAN:   Luciano Cueto is a 44 year old year-old male with active diagnoses including acute viral hepatitis A. Prior medical/surgical history in note table.    he is here today for follow up  Today:  #hepatis A  #hepatomegaly  -acute hepatitis A in May with LFT elevation & jaundice. LFTs down trending, and bilirubin normal as of July. CT finding of hepatomegaly and mild steatosis. Repeat labs overdue since September   -denies abdominal pain or jaundice  -repeat LFTs now, US liver already ordered by other provider. Follow up pending results.     Recommendations:  -go to lab for repeat blood work    -complete ultrasound of liver ordered by MONA Franklin. #[phone]    -will be due for first colonoscopy when you turn 46    -follow up in 6-12 months        Orders This Visit:  No orders of the defined types were placed in this encounter.      Meds This Visit:  Requested Prescriptions      No prescriptions requested or ordered in this encounter       Imaging & Referrals:  None      MONA Humphreys    [facility]  12/4/2024        This note was partially prepared using Dragon Medical voice recognition dictation software. As a  result, errors may occur. When identified, these errors have been corrected. While every attempt is made to correct errors during dictation, discrepancies may still exist.

## 2024-12-04 NOTE — PATIENT INSTRUCTIONS
-go to lab for repeat blood work    -complete ultrasound of liver ordered by MONA Franklin. #[phone]    -will be due for first colonoscopy when you turn 46    -follow up in 6-12 months

## 2024-12-05 ENCOUNTER — LAB ENCOUNTER (OUTPATIENT)
Dept: LAB | Age: 44
End: 2024-12-05
Payer: COMMERCIAL

## 2024-12-05 DIAGNOSIS — B15.9 ACUTE HEPATITIS A: ICD-10-CM

## 2024-12-05 LAB
ALBUMIN SERPL-MCNC: 4.3 G/DL (ref 3.2–4.8)
ALP LIVER SERPL-CCNC: 75 U/L
ALT SERPL-CCNC: 33 U/L
AST SERPL-CCNC: 28 U/L (ref ?–34)
BILIRUB DIRECT SERPL-MCNC: 0.1 MG/DL (ref ?–0.3)
BILIRUB SERPL-MCNC: 0.4 MG/DL (ref 0.3–1.2)
PROT SERPL-MCNC: 7.4 G/DL (ref 5.7–8.2)

## 2024-12-05 PROCEDURE — 80076 HEPATIC FUNCTION PANEL: CPT

## 2024-12-05 PROCEDURE — 36415 COLL VENOUS BLD VENIPUNCTURE: CPT

## 2025-01-13 ENCOUNTER — HOSPITAL ENCOUNTER (OUTPATIENT)
Dept: ULTRASOUND IMAGING | Age: 45
Discharge: HOME | End: 2025-01-13
Payer: COMMERCIAL

## 2025-01-13 DIAGNOSIS — R74.8 ELEVATED LIVER ENZYMES: ICD-10-CM

## 2025-01-13 DIAGNOSIS — R17 ELEVATED BILIRUBIN: ICD-10-CM

## 2025-01-13 DIAGNOSIS — E78.6 LOW HDL (UNDER 40): ICD-10-CM

## 2025-01-13 PROCEDURE — 76705 ECHO EXAM OF ABDOMEN: CPT

## 2025-01-15 ENCOUNTER — TELEPHONE (OUTPATIENT)
Facility: CLINIC | Age: 45
End: 2025-01-15

## 2025-01-15 DIAGNOSIS — R16.0 HEPATOMEGALY: ICD-10-CM

## 2025-01-15 DIAGNOSIS — K80.20 CALCULUS OF GALLBLADDER WITHOUT CHOLECYSTITIS WITHOUT OBSTRUCTION: ICD-10-CM

## 2025-01-15 DIAGNOSIS — R93.5 ABNORMAL ULTRASOUND OF ABDOMEN: Primary | ICD-10-CM

## 2025-01-15 DIAGNOSIS — K83.8 DILATION OF COMMON BILE DUCT: ICD-10-CM

## 2025-01-15 DIAGNOSIS — K76.0 HEPATIC STEATOSIS: ICD-10-CM

## 2025-01-15 NOTE — TELEPHONE ENCOUNTER
Called & spoke to patient. MRI MRCP ordered 2/2 abnormal US finding.   Denies abdominal pain or biliary colic symptoms

## 2025-02-14 ENCOUNTER — TELEPHONE (OUTPATIENT)
Facility: CLINIC | Age: 45
End: 2025-02-14

## 2025-02-14 NOTE — TELEPHONE ENCOUNTER
MRI MRCP (W+WO) (CPT=74183) (Order #[id number]) on 1/15/25   Appointments for this Order    Date/Time Provider Department   3/29/25 8:30 AM  - 75 min [facility] MRI RM1 (1.5T WIDE) (Resource) [facility]

## 2025-03-29 ENCOUNTER — HOSPITAL ENCOUNTER (OUTPATIENT)
Dept: MRI IMAGING | Facility: HOSPITAL | Age: 45
Discharge: HOME | End: 2025-03-29
Payer: COMMERCIAL

## 2025-03-29 DIAGNOSIS — R93.5 ABNORMAL ULTRASOUND OF ABDOMEN: ICD-10-CM

## 2025-03-29 DIAGNOSIS — K83.8 DILATION OF COMMON BILE DUCT: ICD-10-CM

## 2025-03-29 PROCEDURE — A9575 INJ GADOTERATE MEGLUMI 0.1ML: HCPCS

## 2025-03-29 PROCEDURE — 74183 MRI ABD W/O CNTR FLWD CNTR: CPT

## 2025-03-29 RX ORDER — GADOTERATE MEGLUMINE 376.9 MG/ML
20 INJECTION INTRAVENOUS
Status: COMPLETED | OUTPATIENT
Start: 2025-03-29 | End: 2025-03-29

## 2025-03-29 RX ADMIN — GADOTERATE MEGLUMINE 20 ML: 376.9 INJECTION INTRAVENOUS at 09:24:00

## 2025-04-03 ENCOUNTER — TELEPHONE (OUTPATIENT)
Facility: CLINIC | Age: 45
End: 2025-04-03

## 2025-04-03 DIAGNOSIS — R93.3 ABNORMAL MAGNETIC RESONANCE CHOLANGIOPANCREATOGRAPHY (MRCP): Primary | ICD-10-CM

## 2025-05-16 ENCOUNTER — TELEPHONE (OUTPATIENT)
Facility: CLINIC | Age: 45
End: 2025-05-16

## 2025-05-16 NOTE — TELEPHONE ENCOUNTER
1st,Overdue reminder letter sent out via My chart for the following:  Liver Function Panel (7) (Order #[id number]) on 4/3/25

## (undated) NOTE — LETTER
11/6/2024          Luciano Cueto    [address] [facility] [address]         Dear Luciano,    Our records indicate that the tests ordered for you by MONA Humphreys  have not been done.  If you have, in fact, already completed the tests or you do not wish to have the tests done, please contact our office at THE NUMBER LISTED BELOW.  Otherwise, please proceed with the testing.  Enclosed is a duplicate order for your convenience.    Hepatic Function Panel (7) (Order #[id number]) on 7/16/24     Sincerely,    MONA Humphreys  [address] [address]  [facility] [address]  [phone]

## (undated) NOTE — LETTER
5/16/2025          Luciano Cueto    [address] [facility] [address]         Dear Luciano,    Our records indicate that the tests ordered for you by MONA Humphreys  have not been done.  If you have, in fact, already completed the tests or you do not wish to have the tests done, please contact our office at THE NUMBER LISTED BELOW.  Otherwise, please proceed with the testing.  Enclosed is a duplicate order for your convenience.    Labs Order:    Liver Function Panel (7) (Order #[id number]) on 4/3/25               Sincerely,    MONA Humphreys  [facility] [place]  [address] [id number] [phone]